# Patient Record
Sex: MALE | Race: BLACK OR AFRICAN AMERICAN | NOT HISPANIC OR LATINO | Employment: STUDENT | URBAN - METROPOLITAN AREA
[De-identification: names, ages, dates, MRNs, and addresses within clinical notes are randomized per-mention and may not be internally consistent; named-entity substitution may affect disease eponyms.]

---

## 2021-10-22 ENCOUNTER — NURSE TRIAGE (OUTPATIENT)
Dept: OTHER | Facility: OTHER | Age: 21
End: 2021-10-22

## 2021-10-22 DIAGNOSIS — Z20.822 SUSPECTED SEVERE ACUTE RESPIRATORY SYNDROME CORONAVIRUS 2 (SARS-COV-2) INFECTION: Primary | ICD-10-CM

## 2021-10-22 PROCEDURE — U0005 INFEC AGEN DETEC AMPLI PROBE: HCPCS | Performed by: FAMILY MEDICINE

## 2021-10-22 PROCEDURE — U0003 INFECTIOUS AGENT DETECTION BY NUCLEIC ACID (DNA OR RNA); SEVERE ACUTE RESPIRATORY SYNDROME CORONAVIRUS 2 (SARS-COV-2) (CORONAVIRUS DISEASE [COVID-19]), AMPLIFIED PROBE TECHNIQUE, MAKING USE OF HIGH THROUGHPUT TECHNOLOGIES AS DESCRIBED BY CMS-2020-01-R: HCPCS | Performed by: FAMILY MEDICINE

## 2021-10-23 LAB — SARS-COV-2 RNA RESP QL NAA+PROBE: NEGATIVE

## 2022-08-04 ENCOUNTER — SEXUAL HEALTH (OUTPATIENT)
Dept: SURGERY | Facility: CLINIC | Age: 22
End: 2022-08-04

## 2022-08-04 DIAGNOSIS — Z11.3 SCREENING FOR STD (SEXUALLY TRANSMITTED DISEASE): Primary | ICD-10-CM

## 2022-08-04 NOTE — PROGRESS NOTES
Assessment/Plan:    Urine collected for GC/CT testing  Blood collected for HIV/syphilis testing  Recommend safer sex practices including 100% condom use  Recommend regular STD testing  Follow up 1 week for results  Practicing safe sex using a condom for each sexually encounter  Condoms offer the best protection against STD's by acting as a physical barrier to prevent the exchange of semen, vaginal fluids, and blood between partners  Condoms are not a 100% effective in protection  Reducing the number of sexual partners can also help to reduce the risk of the transmission of STD's along with regular STD screening  Patient verbalized understanding of all that was discusse today and agrees with plan  He was able to ask all of his questions and comprehensive answers were provided  No problem-specific Assessment & Plan notes found for this encounter  Diagnoses and all orders for this visit:    Screening for STD (sexually transmitted disease)          Subjective:      Patient ID: Constance Miguel is a 23 y o  male  Patient presents to STI clinic for STI screening  Reports one partner and reports using condoms  Denies urethral discharge, burning or pain with urination, blood in urine, rashes, sores, fever, chills, testicular pain or swelling  The following portions of the patient's history were reviewed and updated as appropriate: allergies, current medications, past family history, past medical history, past social history, past surgical history and problem list     Review of Systems   Constitutional: Negative for chills, diaphoresis, fatigue and fever  Gastrointestinal: Negative for abdominal pain  Genitourinary: Negative for decreased urine volume, difficulty urinating, dysuria, frequency, genital sores, hematuria, penile discharge, penile pain, penile swelling, scrotal swelling, testicular pain and urgency  Skin: Negative for rash and wound  Hematological: Negative for adenopathy  Objective: There were no vitals taken for this visit  Physical Exam  Nursing note reviewed  Constitutional:       General: He is not in acute distress  Appearance: Normal appearance  He is not ill-appearing, toxic-appearing or diaphoretic  Eyes:      General: No scleral icterus  Neurological:      Mental Status: He is alert and oriented to person, place, and time  Gait: Gait normal    Psychiatric:         Mood and Affect: Mood normal          Behavior: Behavior normal          Thought Content: Thought content normal          Judgment: Judgment normal              exam offered, deferred by patient  CHIEF CONCERN(S)        Condom Used: Yes    Sexual Preference :  Female    Date of Last Sexual Exposure:  7/23/2022    # of Partners: Last 30 days 1, Last 90 days 3 and Last Year 5    Sexual Practices: Vaginal      Previously Sexually Transmitted Diseases  Type Date Source of Care Treatment Comment   Denies                         Test Date Results   RPR 6/3/22 Negative   HIC 6/3/22 Negative   GC 6/3/22 Negative   CT 6/3/22 Negative         1  CURRENT RISK BEHAVIOR(S)    sex under the influence of drugs or alcohol     PREVIOUS SUCCESSES    Used condoms when having sex, Maintained a monogamous relationship with only one partner and Discussed condom use prior to having sex with partner(s)        3  SAFER GOAL BEHAVIOR(S)    Always use condoms to have sex, Practice abstinence, Pre-exposure prophylaxis (PrEP), Practice monogamy and Get tested if condom breaks/ leaks          4   PERSON ACTION PLAN:    > BARRIERS:    No condom use or discussions and Get involved in a monogamist relationship    > BENEFITS:    Obtain free condoms from [facility] to decrease STD exposure and Provides a healthier sexual relationship and can reduce STD's        > ACTION STEPS:      Use condoms at least 50% of the time and steadily increase over time until condom use is 100% of the time, Choose to abstain from sex, Discuss condom use prior to having sex with partner(s) and Get tested is an exposure occurred such as a condom breaks/ leaked          4  REFERRALS:  Consent given for HIV testing

## 2022-08-11 ENCOUNTER — SEXUAL HEALTH (OUTPATIENT)
Dept: SURGERY | Facility: CLINIC | Age: 22
End: 2022-08-11

## 2022-08-11 DIAGNOSIS — Z71.2 ENCOUNTER TO DISCUSS TEST RESULTS: Primary | ICD-10-CM

## 2022-08-11 NOTE — PROGRESS NOTES
Returned for hiv/sti results   All tests : GC, CT, HIV,SYPHILLIS negative  Copies given, re educated on safe sex practices and when to return for testing, pt stated understanding

## 2022-09-15 ENCOUNTER — SEXUAL HEALTH (OUTPATIENT)
Dept: SURGERY | Facility: CLINIC | Age: 22
End: 2022-09-15

## 2022-09-15 DIAGNOSIS — Z11.3 SCREENING FOR STD (SEXUALLY TRANSMITTED DISEASE): Primary | ICD-10-CM

## 2022-09-15 NOTE — PROGRESS NOTES
Assessment/Plan:  Urine collected for GC/CT testing  Blood collected for HIV/syphilis testing  Recommend safer sex practices including 100% condom use  Recommend regular STD testing  Follow up 1 week for results  Practicing safe sex using a condom for each sexually encounter  Condoms offer the best protection against STD's by acting as a physical barrier to prevent the exchange of semen, vaginal fluids, and blood between partners  Condoms are not a 100% effective in protection  Reducing the number of sexual partners can also help to reduce the risk of the transmission of STD's along with regular STD screening  Patient verbalized understanding of all that was discusse today and agrees with plan  He was able to ask all of his questions and comprehensive answers were provided  No problem-specific Assessment & Plan notes found for this encounter  Diagnoses and all orders for this visit:    Screening for STD (sexually transmitted disease)          Subjective:      Patient ID: Bethany Lanza is a 23 y o  male  Patient presents to STD clinic for STD screening  Sexually active with 2 partners  Using condoms 90% of the time  Reports wearing tight jeans 5 days ago and sustaining an abrasion in L inguinal area/left scrotum  Reports it has significantly improved since then  Denies other rashes or sores, fever, chills, urethral discharge, blood in urine, burning or pain with urination  The following portions of the patient's history were reviewed and updated as appropriate: allergies, current medications, past family history, past medical history, past social history, past surgical history and problem list     Review of Systems   Constitutional: Negative for chills, diaphoresis, fatigue and fever  Gastrointestinal: Negative for abdominal pain     Genitourinary: Negative for decreased urine volume, difficulty urinating, dysuria, frequency, genital sores, hematuria, penile discharge, penile pain, penile swelling, scrotal swelling, testicular pain and urgency  L inguinal abrasion from tight clothing   Skin: Negative for rash and wound  Hematological: Negative for adenopathy  Objective: There were no vitals taken for this visit  Physical Exam  Nursing note reviewed  Exam conducted with a chaperone present  Constitutional:       General: He is not in acute distress  Appearance: Normal appearance  He is normal weight  He is not ill-appearing, toxic-appearing or diaphoretic  Genitourinary:     Testes: Normal          Right: Mass, tenderness or swelling not present  Right testis is descended  Left: Mass, tenderness or swelling not present  Left testis is descended  Epididymis:      Right: Normal  Not inflamed or enlarged  No tenderness  Left: Normal  Not inflamed or enlarged  No tenderness  Neurological:      Mental Status: He is alert  Psychiatric:         Mood and Affect: Mood normal          Behavior: Behavior normal          Thought Content: Thought content normal          Judgment: Judgment normal                 CHIEF CONCERN(S)          Condom Used: Yes    Sexual Preference :  Female    Date of Last Sexual Exposure: 9/10/22    # of Partners: Last 30 days 2, Last 90 days 2 and Last Year 4    Sexual Practices: Vaginal      Previously Sexually Transmitted Diseases  Type Date Source of Care Treatment Comment   Denies                         Test Date Results   RPR 8/4/22 Negative   HIC 8/4/22 Negative   GC 8/4/22 Negative   CT 8/4/22 Negative         1  CURRENT RISK BEHAVIOR(S)    sex under the influence of drugs or alcohol and Other     PREVIOUS SUCCESSES    Used condoms when having sex, Maintained a monogamous relationship with only one partner, Practiced abstinence and Discussed condom use prior to having sex with partner(s)        3   SAFER GOAL BEHAVIOR(S)    Always use condoms to have sex, Practice abstinence, Pre-exposure prophylaxis (PrEP), Practice monogamy and Get tested if condom breaks/ leaks          4  PERSON ACTION PLAN:    > BARRIERS:    No condom use or discussions and Get involved in a monogamist relationship    > BENEFITS:    Obtain free condoms from [facility] to decrease STD exposure and Provides a healthier sexual relationship and can reduce STD's        > ACTION STEPS:      Use condoms at least 50% of the time and steadily increase over time until condom use is 100% of the time, Choose to abstain from sex, Discuss condom use prior to having sex with partner(s) and Get tested is an exposure occurred such as a condom breaks/ leaked          4  REFERRALS:    Consent given by patient for HIV testing

## 2022-09-22 ENCOUNTER — SEXUAL HEALTH (OUTPATIENT)
Dept: SURGERY | Facility: CLINIC | Age: 22
End: 2022-09-22

## 2022-09-22 DIAGNOSIS — A15.9 TUBERCULOSIS: Primary | ICD-10-CM

## 2022-09-22 NOTE — PROGRESS NOTES
Pt given results for HIV/STI testing  Pt results were negative for HIV,CT, GC and RPR  Results reviewed and copies were given to patient  Patient re-educated on safe sex practices  Patient states understanding  Pt encouraged to return with any new symptoms, new sexual partners and at least once yearly  Per STD clinic protocol client did not need to see provider, He was seen at previous visit and had no symptoms at this time

## 2023-06-15 ENCOUNTER — SEXUAL HEALTH (OUTPATIENT)
Dept: SURGERY | Facility: CLINIC | Age: 23
End: 2023-06-15

## 2023-06-15 DIAGNOSIS — Z11.3 SCREENING FOR STD (SEXUALLY TRANSMITTED DISEASE): Primary | ICD-10-CM

## 2023-06-15 NOTE — PROGRESS NOTES
Assessment/Plan:  Urine collected for GC/CT testing  Blood collected for HIV/syphilis testing  Recommend safer sex practices including 100% condom use  Recommend regular STD testing  Follow up 1 week for results  Practicing safe sex using a condom for each sexually encounter  Condoms offer the best protection against STD's by acting as a physical barrier to prevent the exchange of semen, vaginal fluids, and blood between partners  Condoms are not a 100% effective in protection  Reducing the number of sexual partners can also help to reduce the risk of the transmission of STD's along with regular STD screening  No problem-specific Assessment & Plan notes found for this encounter  Diagnoses and all orders for this visit:    Screening for STD (sexually transmitted disease)          Subjective:      Patient ID: Denise Kingsley is a 20 y o  male  Patient presents to STD clinic for STD screening  Reports inconsistent condom use  Does not offer any complaints  The following portions of the patient's history were reviewed and updated as appropriate: allergies, current medications, past family history, past medical history, past social history, past surgical history and problem list     Review of Systems   Constitutional: Negative for chills, diaphoresis, fatigue and fever  Gastrointestinal: Negative for abdominal pain  Genitourinary: Negative for decreased urine volume, difficulty urinating, dysuria, frequency, genital sores, hematuria, penile discharge, penile pain, penile swelling, scrotal swelling, testicular pain and urgency  Skin: Negative for rash and wound  Hematological: Negative for adenopathy  Objective: There were no vitals taken for this visit  Physical Exam  Constitutional:       General: He is not in acute distress  Appearance: Normal appearance  He is not ill-appearing, toxic-appearing or diaphoretic  HENT:      Head: Normocephalic and atraumatic  Eyes:      General: No scleral icterus  Neurological:      Mental Status: He is alert and oriented to person, place, and time  Psychiatric:         Mood and Affect: Mood normal          Behavior: Behavior normal          Thought Content: Thought content normal          Judgment: Judgment normal              exam offered, patient refused  CHIEF CONCERN(S)  Patient came in for routine testing        Condom Used: Occasionally    Sexual Preference :  Female    Date of Last Sexual Exposure: 6/10/2023    # of Partners: Last 30 days 2, Last 80 days 3 and Last Year 3    Sexual Practices: Vaginal      Previously Sexually Transmitted Diseases  Type Date Source of Care Treatment Comment   n/a                         Test Date Results   RPR 9/2022 neg   HIV 9/2022 neg   GC 9/2022 neg   CT 9/2022 neg         1  CURRENT RISK BEHAVIOR(S)    Unprotected sex with multiple/anonymous partners and Other     PREVIOUS SUCCESSES    Used condoms when having sex and Discussed condom use prior to having sex with partner(s)        3  SAFER GOAL BEHAVIOR(S)    Always use condoms to have sex and Get tested if condom breaks/ leaks          4  PERSON ACTION PLAN:    > BARRIERS:    Get involved in a monogamist relationship    > BENEFITS:    Provides a healthier sexual relationship and can reduce STD's        > ACTION STEPS:      Get tested is an exposure occurred such as a condom breaks/ leaked          4   REFERRALS:        Hiv consent given

## 2023-06-22 ENCOUNTER — SEXUAL HEALTH (OUTPATIENT)
Dept: SURGERY | Facility: CLINIC | Age: 23
End: 2023-06-22

## 2023-06-22 DIAGNOSIS — Z71.2 ENCOUNTER TO DISCUSS TEST RESULTS: Primary | ICD-10-CM

## 2023-06-22 NOTE — PROGRESS NOTES
Pt here for STD/HIV results  HIV, RPR, GC, CT all negative, reviewed and given to pt  Pt re-educated on safe sex practices  Pt stated understanding

## 2024-07-25 ENCOUNTER — OFFICE VISIT (OUTPATIENT)
Dept: OBGYN CLINIC | Facility: CLINIC | Age: 24
End: 2024-07-25
Payer: COMMERCIAL

## 2024-07-25 DIAGNOSIS — M76.51 PATELLAR TENDINITIS OF BOTH KNEES: Primary | ICD-10-CM

## 2024-07-25 DIAGNOSIS — M76.52 PATELLAR TENDINITIS OF BOTH KNEES: Primary | ICD-10-CM

## 2024-07-25 PROCEDURE — 99203 OFFICE O/P NEW LOW 30 MIN: CPT | Performed by: FAMILY MEDICINE

## 2024-07-25 NOTE — PROGRESS NOTES
Assessment:     1. Patellar tendinitis of both knees  Patellar strap        Orders Placed This Encounter   Procedures    Patellar strap        Impression:   Bilateral knee pain likely secondary to patellar tendinitis.      Conservative Management   We discussed different treatment options:  No documentation to review at this time    Ice or Heat Therapy as needed 1-2 times daily for 10-20 minutes. As tolerated.   Over the counter Tylenol and/or NSAIDs  as needed based off your Past Medical Hx. Please follow product label for dosing and maximum limits.    Reviewed handout provided on General Information of lower extremity prevention program.  Please range joint through gentle range of motion as tolerated.   Discussed patellar tendon strap.  Patient states he has one at home.        Imaging   No imaging was available for review today.    Procedure  Not appropriate at this time.     Shared decision making, patient agreeable to plan.      Return for Follow up as needed or if symptoms do NOT improve.    HPI:   Rae Escobar is a 24 y.o. male  who presents for evaluation of   Chief Complaint   Patient presents with    Left Knee - Pain     Pain 7 after soccer     Right Knee - Pain     Pain 7 after soccer        Occupation: Job entails: Standing, sitting, walking  Injury Related: No     Onset/Mechanism: Bilateral knee pain for approximately 2 months only while playing soccer.  Denies any trauma..  Location: Bilateral patellar tendon.  Right worse than left..  Severity: Current severity: 0/10. Max severity: 6-7/10.  Pain described as: Hard, pulling  Radiation: Denies pain rating up into hip or down into ankle..  Provocative: Post soccer, knee flexion.  Associated symptoms: denies Clicking, popping, locking..  Denies instability.  Denies swelling    Denies any hx of fracture of affected limb.   Denies any surgical history of affected limb.      Summary of treatment to-date:   Topical cream   No bracing       Following History  Reviewed and Updated   History reviewed. No pertinent past medical history.  History reviewed. No pertinent surgical history.  History reviewed. No pertinent family history.    Social History     Substance and Sexual Activity   Alcohol Use None     Social History     Substance and Sexual Activity   Drug Use Not on file     Social History     Tobacco Use   Smoking Status Never   Smokeless Tobacco Never       Social Determinants of Health     Tobacco Use: Low Risk  (7/25/2024)    Patient History     Smoking Tobacco Use: Never     Smokeless Tobacco Use: Never     Passive Exposure: Not on file   Alcohol Use: Not At Risk (6/7/2022)    Received from [facility]    AUDIT-C     Frequency of Alcohol Consumption: Monthly or less     Average Number of Drinks: 1 or 2     Frequency of Binge Drinking: Less than monthly   Financial Resource Strain: Low Risk  (6/7/2022)    Received from [facility]    Overall Financial Resource Strain (CARDIA)     Difficulty of Paying Living Expenses: Not hard at all   Food Insecurity: No Food Insecurity (6/7/2022)    Received from [facility]    Hunger Vital Sign     Worried About Running Out of Food in the Last Year: Never true     Ran Out of Food in the Last Year: Never true   Transportation Needs: No Transportation Needs (6/7/2022)    Received from [facility]    PRAPARE - Transportation     Lack of Transportation (Medical): No     Lack of Transportation (Non-Medical): No   Physical Activity: Not on file   Stress: No Stress Concern Present (6/7/2022)    Received from [facility]    [place] [employer] - Occupational Stress Questionnaire     Feeling of Stress : Not at all   Social Connections: Socially Isolated (6/7/2022)    Received from [facility]    Social Connection and Isolation Panel [NHANES]     Frequency of Communication with Friends and Family: Twice a week      Frequency of Social Gatherings with Friends and Family: Twice a week     Attends [employer] Services: Never     Active Member of Clubs or Organizations: No     Attends Club or Organization Meetings: Never     Marital Status: Never    Intimate Partner Violence: Not At Risk (6/7/2022)    Received from [facility]    Humiliation, Afraid, Rape, and Kick questionnaire     Fear of Current or Ex-Partner: No     Emotionally Abused: No     Physically Abused: No     Sexually Abused: No   Depression: Not at risk (6/7/2022)    Received from [facility]    PHQ-2     PHQ-2 Score: 0   Housing Stability: Not on file   Utilities: Not on file   Health Literacy: Not on file        Not on File    Review of Systems      Review of Systems     Review of Systems   Constitutional: Negative for chills and fever.   HENT: Negative for drooling and sneezing.    Eyes: Negative for redness.   Respiratory: Negative for cough and wheezing.    Gastrointestinal: Negative for vomiting.   Psychiatric/Behavioral: Negative for behavioral problems. The patient is not nervous/anxious.      All other systems negative.   Physical Exam   Physical Exam    Vitals and nursing note reviewed.  Constitutional:   Appearance. Normal Appearance.  There were no vitals taken for this visit.    There is no height or weight on file to calculate BMI.   HENT:  Head: Atraumatic.  Nose: Nose normal  Eyes: Conjunctiva/sclera: Conjunctivae normal.  Cardiovascular:   Rate and Rhythm: Bilateral equal distal pulses  Pulmonary:   Effort: Pulmonary effort is normal  Skin:   General: Skin is warm and dry.  Neurological:   General: No focal deficit present.  Mental Status: Alert and oriented to person, place, and time.   Psychiatric:   Mood and Affect: mood normal.  Behavior: Behavior normal     Musculoskeletal Exam     Ortho Exam     B/L Knee:     Inspection:  Erythema Bruising Effusion Muscle atrophy Retracted muscle   Negative Negative  Negative Negative Negative     Palpation:  Increased warmth Crepitus Palpable muscle depression   Negative Negative Negative     Tenderness:  Quadriceps tendon Patella Patellar tendon  Joint line   Neg. Neg. No discomfort today, patient states this is where his typical discomfort is. Neg.        Tibial tubercle Zoe's tubercle Pes anserine bursa Posterior knee   Neg. Neg. Neg. Neg.       Biceps femoris Semimembranosus/semitendinosis Popliteus tendon Fibular head   Neg. Neg. Neg. Neg.       Bilateral Range of Motion:  Active flexion Passive flexion   (normal 135 degrees) (normal 135 degrees)   Intact      Active extension Passive extension   (normal 0 degrees) (normal 0 degrees)   Intact        Bilateral strength:  Seated hip flexion Seated knee flexion Seated knee extension   5/5 5/5 5/5     Seated great toe extension Seated ankle dorsiflexion Seated ankle plantarflexion   5/5 5/5 5/5     Seated hip adduction Seated hip abduction Prone knee flexion              Patella:  Patellofemoral tracking  Patellar Displacement Patellar Tilt Patellar Apprehension Patellar Grind Krishnamurthy's Hoffa's test  Medial patellar plica test    observing the patella for smooth motion while the patient contracts the quadriceps muscle     applying pressure to the fat pad with pain felt in the last 10 degrees of extension 30 degrees of knee flexion with pressure on the lateral border of the patella directed medially   normal and symmetrical   normal and symmetrical normal and symmetrical Negative Negative Negative, without pain Active, without pain       Ligament testing:  Anterior cruciate ligament (ACL)  Posterior cruciate ligament (PCL) Medial Collateral Ligament (MCL)  Lateral Collateral Ligament (LCL):   Lachman's Posterior drawer's Valgus Varus   Negative for laxity Negative for laxity Negative for laxity Negative for laxity     Meniscal testing:  Medial Karthikeyan Lateral Karthikeyan Apley's Thessaly's Bounce home test   Negative Negative  "N/A N/A N/A     Special tests:  Ely's test: Yaw test Sonido's test      Rectus femoris: Prone position with passive knee flexion iliopsoas: supine position with passive hip flexion  seated position,active internal rotation of the tibia, knee extended    Contralateral leg remains on the table without contracture      IT Band Testing:   Noble's test Rick's test  Jeremiah Creak test    Supine position, passive 0 to 90 degrees of knee flexion while applying firm pressure to the lateral epicondyle lateral recumbent position, hip extended and AB ducted standing position, single-leg squat of knee flexion to 60 to 90 degrees, pressure applied at the slightly above lateral femoral condyle            Special testing:  Weight bearing Squat Single-leg squat Single leg plantarflexion   Able to complete without discomfort Able to complete without discomfort        Single-leg hop  Toe-walking Heel -walking    able to complete without discomfort         Neurovascular:  Sensation to light touch Posterior tibial artery   Intact and equal bilaterally Intact and equal bilaterally     (Test not completed if space left blank )           Procedures       Portions of the record may have been created with voice recognition software. Occasional wrong word or \"sound alike\" substitutions may have occurred due to the inherent limitations of voice recognition software. Please review the chart carefully and recognize, using context, where substitutions/typographical errors may have occurred.   "

## 2025-06-26 ENCOUNTER — OFFICE VISIT (OUTPATIENT)
Dept: URGENT CARE | Age: 25
End: 2025-06-26
Payer: COMMERCIAL

## 2025-06-26 VITALS
HEART RATE: 68 BPM | RESPIRATION RATE: 18 BRPM | OXYGEN SATURATION: 99 % | TEMPERATURE: 99.6 F | DIASTOLIC BLOOD PRESSURE: 83 MMHG | SYSTOLIC BLOOD PRESSURE: 129 MMHG

## 2025-06-26 DIAGNOSIS — J03.90 EXUDATIVE TONSILLITIS: Primary | ICD-10-CM

## 2025-06-26 DIAGNOSIS — J02.9 SORE THROAT: ICD-10-CM

## 2025-06-26 PROCEDURE — 87880 STREP A ASSAY W/OPTIC: CPT

## 2025-06-26 PROCEDURE — G0382 LEV 3 HOSP TYPE B ED VISIT: HCPCS

## 2025-06-26 PROCEDURE — S9083 URGENT CARE CENTER GLOBAL: HCPCS

## 2025-06-26 PROCEDURE — 87070 CULTURE OTHR SPECIMN AEROBIC: CPT

## 2025-06-26 RX ORDER — PENICILLIN V POTASSIUM 500 MG/1
500 TABLET, FILM COATED ORAL 2 TIMES DAILY
Qty: 20 TABLET | Refills: 0 | Status: SHIPPED | OUTPATIENT
Start: 2025-06-26 | End: 2025-07-06

## 2025-06-26 NOTE — LETTER
June 26, 2025     Patient: Rae Escobar   YOB: 2000   Date of Visit: 6/26/2025       To Whom it May Concern:    Rae Escobar was seen in my clinic on 6/26/2025.     If you have any questions or concerns, please don't hesitate to call.         Sincerely,          Jovan Bowen PA-C        CC: No Recipients

## 2025-06-26 NOTE — PROGRESS NOTES
[facility]        NAME: Rae Escobar is a 25 y.o. male  : 2000    MRN: [id number]  DATE: 2025  TIME: 11:56 AM    Assessment and Plan   Exudative tonsillitis [J03.90]  1. Exudative tonsillitis  penicillin V potassium (VEETID) 500 mg tablet      2. Sore throat  POCT rapid ANTIGEN strepA            Patient Instructions       Follow up with PCP in 3-5 days.  Proceed to  ER if symptoms worsen.    If tests have been performed at [facility], our office will contact you with results if changes need to be made to the care plan discussed with you at the visit.  You can review your full results on [facility].    Chief Complaint     Chief Complaint   Patient presents with    Sore Throat     Reports sore throat, fever, chills, and exhaustion since tuesday          History of Present Illness       25-year-old male presents for sore throat x 3 days.  Subjective fevers and chills at night.  Denies known sick contacts.  Using Chloraseptic spray without relief.    Sore Throat   Associated symptoms include headaches. Pertinent negatives include no congestion or ear pain.       Review of Systems   Review of Systems   Constitutional:  Positive for chills and fever.   HENT:  Positive for sore throat. Negative for congestion, ear pain and postnasal drip.    Neurological:  Positive for headaches.         Current Medications     Current Medications[1]    Current Allergies     Allergies as of 2025    (No Known Allergies)            The following portions of the patient's history were reviewed and updated as appropriate: allergies, current medications, past family history, past medical history, past social history, past surgical history and problem list.     Past Medical History[2]    Past Surgical History[3]    Family History[4]      Medications have been verified.        Objective   /83   Pulse 68   Temp 99.6 °F (37.6 °C) (Tympanic Core)   Resp 18   SpO2 99%   No LMP for male patient.        Physical Exam     Physical Exam  Vitals and nursing note reviewed.   Constitutional:       General: He is not in acute distress.     Appearance: He is not toxic-appearing.   HENT:      Head: Normocephalic and atraumatic.      Right Ear: Tympanic membrane and ear canal normal.      Left Ear: Tympanic membrane and ear canal normal.      Nose: No congestion or rhinorrhea.      Mouth/Throat:      Mouth: Mucous membranes are moist.      Pharynx: Uvula midline. Pharyngeal swelling, oropharyngeal exudate and posterior oropharyngeal erythema present. No uvula swelling.      Tonsils: Tonsillar exudate present. 2+ on the right. 0 on the left.     Eyes:      Conjunctiva/sclera: Conjunctivae normal.     Lymphadenopathy:      Cervical: No cervical adenopathy.     Neurological:      Mental Status: He is alert.     Psychiatric:         Mood and Affect: Mood normal.         Behavior: Behavior normal.                        [1]   Current Outpatient Medications:     penicillin V potassium (VEETID) 500 mg tablet, Take 1 tablet (500 mg total) by mouth in the morning and 1 tablet (500 mg total) before bedtime. Do all this for 10 days., Disp: 20 tablet, Rfl: 0  [2] No past medical history on file.  [3] No past surgical history on file.  [4] No family history on file.

## 2025-06-28 LAB — BACTERIA THROAT CULT: NORMAL
